# Patient Record
Sex: FEMALE | Race: WHITE | Employment: UNEMPLOYED | URBAN - METROPOLITAN AREA
[De-identification: names, ages, dates, MRNs, and addresses within clinical notes are randomized per-mention and may not be internally consistent; named-entity substitution may affect disease eponyms.]

---

## 2017-08-28 ENCOUNTER — OFFICE VISIT (OUTPATIENT)
Dept: OBGYN CLINIC | Facility: CLINIC | Age: 37
End: 2017-08-28

## 2017-08-28 VITALS
BODY MASS INDEX: 27 KG/M2 | WEIGHT: 171.38 LBS | DIASTOLIC BLOOD PRESSURE: 82 MMHG | SYSTOLIC BLOOD PRESSURE: 120 MMHG | HEART RATE: 73 BPM

## 2017-08-28 DIAGNOSIS — Z01.419 ENCOUNTER FOR GYNECOLOGICAL EXAMINATION: Primary | ICD-10-CM

## 2017-08-28 PROCEDURE — 99395 PREV VISIT EST AGE 18-39: CPT | Performed by: OBSTETRICS & GYNECOLOGY

## 2017-08-28 NOTE — PROGRESS NOTES
Randee Carrington is a 40year old female [id number] Patient's last menstrual period was 08/21/2017 (approximate). here for annual exam.       Last seen 5/23/16. Last pap 5/2016 normal with neg HPV. Menses q month x 2-3 days.   Has occasional mid cycle sp file.    ALLERGIES:  No Known Allergies      Review of Systems:  Constitutional:  Denies fatigue, night sweats, hot flashes  Eyes:  denies blurred or double vision  Cardiovascular:  denies chest pain or palpitations  Respiratory:  denies shortness of breat tenderness  Perineum/anus: normal      Assessment & Plan:    Kusum Rodriguez is a 40year old female who presents for an annual physical exam.    1. Encounter for gynecological examination  Pap not done. ASCCP guidelines reviewed.    Encouraged annual e